# Patient Record
Sex: MALE | Race: WHITE | Employment: FULL TIME | URBAN - METROPOLITAN AREA
[De-identification: names, ages, dates, MRNs, and addresses within clinical notes are randomized per-mention and may not be internally consistent; named-entity substitution may affect disease eponyms.]

---

## 2017-02-20 ENCOUNTER — HOSPITAL ENCOUNTER (OUTPATIENT)
Age: 37
Discharge: HOME | End: 2017-02-20
Attending: FAMILY MEDICINE
Payer: COMMERCIAL

## 2017-02-20 VITALS
TEMPERATURE: 97 F | SYSTOLIC BLOOD PRESSURE: 128 MMHG | BODY MASS INDEX: 32 KG/M2 | DIASTOLIC BLOOD PRESSURE: 90 MMHG | WEIGHT: 219.81 LBS | HEART RATE: 79 BPM | RESPIRATION RATE: 16 BRPM | OXYGEN SATURATION: 97 %

## 2017-02-20 DIAGNOSIS — J20.9 ACUTE BRONCHITIS, UNSPECIFIED ORGANISM: Primary | ICD-10-CM

## 2017-02-20 PROCEDURE — 99213 OFFICE O/P EST LOW 20 MIN: CPT

## 2017-02-20 PROCEDURE — 99204 OFFICE O/P NEW MOD 45 MIN: CPT

## 2017-02-20 RX ORDER — AZITHROMYCIN 250 MG/1
TABLET, FILM COATED ORAL
Qty: 1 PACKAGE | Refills: 0 | Status: SHIPPED | OUTPATIENT
Start: 2017-02-20 | End: 2017-02-25

## 2017-02-20 RX ORDER — BENZONATATE 100 MG/1
100 CAPSULE ORAL 3 TIMES DAILY PRN
Qty: 21 CAPSULE | Refills: 0 | Status: SHIPPED | OUTPATIENT
Start: 2017-02-20 | End: 2017-02-27

## 2017-02-20 RX ORDER — ALBUTEROL SULFATE 90 UG/1
2 AEROSOL, METERED RESPIRATORY (INHALATION) EVERY 4 HOURS PRN
Qty: 1 INHALER | Refills: 0 | Status: SHIPPED | OUTPATIENT
Start: 2017-02-20 | End: 2017-03-22

## 2017-02-20 NOTE — ED PROVIDER NOTES
Patient presents with:  Cough/URI      HPI:     Shannan Ji is a 39year old male who presents for evaluation and management of a chief complaint of cough without respiratory distress. Location: Respiratory System. Onset gradual starting 1 week ago. visit (from the past 10 hour(s)). Diagnosis:    ICD-10-CM    1. Acute bronchitis, unspecified organism J20.9    started on z pack   Albuterol inhaler for coughing fits     All results reviewed and discussed with patient.   See AVS for detailed discharge

## 2017-02-23 ENCOUNTER — APPOINTMENT (OUTPATIENT)
Dept: GENERAL RADIOLOGY | Facility: HOSPITAL | Age: 37
End: 2017-02-23
Attending: EMERGENCY MEDICINE
Payer: COMMERCIAL

## 2017-02-23 ENCOUNTER — HOSPITAL ENCOUNTER (EMERGENCY)
Facility: HOSPITAL | Age: 37
Discharge: HOME | End: 2017-02-23
Attending: EMERGENCY MEDICINE
Payer: COMMERCIAL

## 2017-02-23 VITALS
BODY MASS INDEX: 31.5 KG/M2 | HEIGHT: 70 IN | HEART RATE: 60 BPM | OXYGEN SATURATION: 97 % | WEIGHT: 220 LBS | RESPIRATION RATE: 18 BRPM | SYSTOLIC BLOOD PRESSURE: 136 MMHG | DIASTOLIC BLOOD PRESSURE: 78 MMHG | TEMPERATURE: 99 F

## 2017-02-23 DIAGNOSIS — J40 BRONCHITIS: ICD-10-CM

## 2017-02-23 DIAGNOSIS — R55 SYNCOPE, VASOVAGAL: Primary | ICD-10-CM

## 2017-02-23 LAB
ATRIAL RATE: 61 BPM
P AXIS: 14 DEGREES
P-R INTERVAL: 170 MS
Q-T INTERVAL: 422 MS
QRS DURATION: 88 MS
QTC CALCULATION (BEZET): 424 MS
R AXIS: 14 DEGREES
T AXIS: 55 DEGREES
VENTRICULAR RATE: 61 BPM

## 2017-02-23 PROCEDURE — 99283 EMERGENCY DEPT VISIT LOW MDM: CPT

## 2017-02-23 PROCEDURE — 93005 ELECTROCARDIOGRAM TRACING: CPT

## 2017-02-23 PROCEDURE — 99284 EMERGENCY DEPT VISIT MOD MDM: CPT

## 2017-02-23 PROCEDURE — 71020 XR CHEST PA + LAT CHEST (CPT=71020): CPT

## 2017-02-23 PROCEDURE — 93010 ELECTROCARDIOGRAM REPORT: CPT

## 2017-02-23 RX ORDER — METHYLPREDNISOLONE 4 MG/1
TABLET ORAL
Qty: 1 PACKAGE | Refills: 0 | Status: SHIPPED | OUTPATIENT
Start: 2017-02-23 | End: 2017-02-28

## 2017-02-23 NOTE — ED INITIAL ASSESSMENT (HPI)
t here for cough x2 weeks. Diagnosed with bronchitis 3 days ago. Pt states he had a coughing fit at home this morning and passed out. Denies fever.

## 2017-02-23 NOTE — ED PROVIDER NOTES
Patient Seen in: [facility] Emergency Department    History   Patient presents with:  Hemoptysis (respiratory)    Stated Complaint: coughing    HPI    14-year-old male with a history of anxiety presents to the emergency department for syncopal episo Anxiety.        Family History   Problem Relation Age of Onset   • Skin cancer Paternal Grandfather          Smoking Status: Never Smoker                      Smokeless Status: Never Used                        Comment: per N unit per day for 7 y years deficit noted. Skin exam: Warm to touch. Good skin turgor. No lacerations, abrasions, lesions noted. ED Course   Labs Reviewed - No data to display   EKG    Rate, intervals and axes as noted on EKG Report.   Rate: 61  Rhythm: Sinus Rhythm  Reading: No Prescribed:  Current Discharge Medication List    START taking these medications    methylPREDNISolone 4 MG Oral Tablet Therapy Pack  Dosepack: use as directed on packaging  Qty: 1 Package Refills: 0

## 2017-03-21 PROBLEM — R05.2 SUBACUTE COUGH: Status: ACTIVE | Noted: 2017-03-21

## 2017-03-28 ENCOUNTER — OFFICE VISIT (OUTPATIENT)
Dept: FAMILY MEDICINE CLINIC | Facility: CLINIC | Age: 37
End: 2017-03-28

## 2017-03-28 VITALS
DIASTOLIC BLOOD PRESSURE: 82 MMHG | HEART RATE: 74 BPM | RESPIRATION RATE: 16 BRPM | BODY MASS INDEX: 32.93 KG/M2 | HEIGHT: 70 IN | SYSTOLIC BLOOD PRESSURE: 125 MMHG | TEMPERATURE: 99 F | WEIGHT: 230 LBS

## 2017-03-28 DIAGNOSIS — R05.9 COUGH: ICD-10-CM

## 2017-03-28 DIAGNOSIS — Z00.00 ROUTINE PHYSICAL EXAMINATION: ICD-10-CM

## 2017-03-28 DIAGNOSIS — R55 VASOVAGAL SYNCOPE: ICD-10-CM

## 2017-03-28 PROCEDURE — 99395 PREV VISIT EST AGE 18-39: CPT | Performed by: FAMILY MEDICINE

## 2017-03-28 NOTE — PROGRESS NOTES
HPI:    Patient ID: Marcos Don is a 39year old male. HPI Comments: Patient is here for routine physical exam. No acute issues. No significant chronic medical problems. Patient is requesting blood testing. Diet and exercise have been good.  Past med rhythm, normal heart sounds and intact distal pulses. Pulmonary/Chest: Breath sounds normal. No respiratory distress. He has no wheezes. He has no rales. Abdominal: Soft. He exhibits no distension. There is no tenderness. There is no rebound.    Muscul

## 2017-04-01 ENCOUNTER — LAB ENCOUNTER (OUTPATIENT)
Dept: LAB | Age: 37
End: 2017-04-01
Attending: FAMILY MEDICINE
Payer: COMMERCIAL

## 2017-04-01 DIAGNOSIS — Z00.00 ROUTINE PHYSICAL EXAMINATION: ICD-10-CM

## 2017-04-01 PROCEDURE — 36415 COLL VENOUS BLD VENIPUNCTURE: CPT

## 2017-04-01 PROCEDURE — 80053 COMPREHEN METABOLIC PANEL: CPT

## 2017-04-01 PROCEDURE — 84443 ASSAY THYROID STIM HORMONE: CPT

## 2017-04-01 PROCEDURE — 80061 LIPID PANEL: CPT

## 2017-04-01 PROCEDURE — 85025 COMPLETE CBC W/AUTO DIFF WBC: CPT

## 2017-04-14 RX ORDER — LEVOCETIRIZINE DIHYDROCHLORIDE 5 MG/1
TABLET, FILM COATED ORAL
Qty: 90 TABLET | Refills: 1 | OUTPATIENT
Start: 2017-04-14

## 2017-04-14 NOTE — TELEPHONE ENCOUNTER
Call reviewed and noted. Patient due for one-year follow-up. Refill request for 90 days to [facility] denied at this time. Please call to schedule a follow-up appointment. Once appointment is made we may refill Xyzal locally for 30 days.   No local

## 2017-04-14 NOTE — TELEPHONE ENCOUNTER
Patient is requesting a refill of Xyzal 5 MG- 1 tablet by mouth daily #90, sent to [facility]. Patient was last seen for an office visit 4-18-16 and per Dr. Kiko Sam progress notes to follow up in 1 year. No office visit scheduled as of today 4-14-17.

## 2017-04-15 NOTE — TELEPHONE ENCOUNTER
Left message for patient to call back regarding refill request. Our office open until 11:30 AM today.

## 2017-04-20 ENCOUNTER — OFFICE VISIT (OUTPATIENT)
Dept: ALLERGY | Facility: CLINIC | Age: 37
End: 2017-04-20

## 2017-04-20 VITALS
RESPIRATION RATE: 18 BRPM | HEART RATE: 87 BPM | WEIGHT: 232 LBS | OXYGEN SATURATION: 97 % | BODY MASS INDEX: 33 KG/M2 | SYSTOLIC BLOOD PRESSURE: 126 MMHG | TEMPERATURE: 98 F | DIASTOLIC BLOOD PRESSURE: 63 MMHG

## 2017-04-20 DIAGNOSIS — H10.10 ALLERGIC CONJUNCTIVITIS, UNSPECIFIED LATERALITY: ICD-10-CM

## 2017-04-20 DIAGNOSIS — R05.9 COUGH: ICD-10-CM

## 2017-04-20 DIAGNOSIS — J30.1 SEASONAL ALLERGIC RHINITIS DUE TO POLLEN: Primary | ICD-10-CM

## 2017-04-20 PROCEDURE — 99214 OFFICE O/P EST MOD 30 MIN: CPT | Performed by: ALLERGY & IMMUNOLOGY

## 2017-04-20 PROCEDURE — 99212 OFFICE O/P EST SF 10 MIN: CPT | Performed by: ALLERGY & IMMUNOLOGY

## 2017-04-20 RX ORDER — LEVOCETIRIZINE DIHYDROCHLORIDE 5 MG/1
TABLET, FILM COATED ORAL
Qty: 90 TABLET | Refills: 2 | Status: SHIPPED | OUTPATIENT
Start: 2017-04-20 | End: 2017-10-17 | Stop reason: ALTCHOICE

## 2017-04-20 NOTE — PROGRESS NOTES
Joel Gar is a 39year old male. HPI:   Patient presents with: Allergies: refill request     Patient is a 26-year-old male who presents for follow-up with a chief complaint of allergies. Patient last seen by me on April 18, 2016.   Patient wit Take 1 tablet (25 mg total) by mouth once daily. Disp: 90 tablet Rfl: 0   alprazolam (XANAX) 0.25 MG Oral Tab Take 1 tablet (0.25 mg total) by mouth nightly as needed for Anxiety.  Disp: 30 tablet Rfl: 0       Allergies:  No Known Allergies      ROS:   Daniel Flonase on a daily basis for best effect compared to as needed  Continue with Xyzal    2. Cough   Patient with a 2 month history of a dry nonproductive cough.   By history cough has been improving over time with recent trial of our annuity through his pulm

## 2017-05-01 ENCOUNTER — OFFICE VISIT (OUTPATIENT)
Dept: FAMILY MEDICINE CLINIC | Facility: CLINIC | Age: 37
End: 2017-05-01

## 2017-05-01 VITALS
DIASTOLIC BLOOD PRESSURE: 87 MMHG | RESPIRATION RATE: 16 BRPM | TEMPERATURE: 98 F | WEIGHT: 234 LBS | HEART RATE: 72 BPM | SYSTOLIC BLOOD PRESSURE: 128 MMHG | BODY MASS INDEX: 34 KG/M2

## 2017-05-01 DIAGNOSIS — H66.91 ACUTE EAR INFECTION, RIGHT: ICD-10-CM

## 2017-05-01 PROCEDURE — 99212 OFFICE O/P EST SF 10 MIN: CPT | Performed by: FAMILY MEDICINE

## 2017-05-01 PROCEDURE — 99213 OFFICE O/P EST LOW 20 MIN: CPT | Performed by: FAMILY MEDICINE

## 2017-05-01 RX ORDER — AMOXICILLIN 875 MG/1
875 TABLET, COATED ORAL 2 TIMES DAILY
Qty: 20 TABLET | Refills: 0 | Status: SHIPPED | OUTPATIENT
Start: 2017-05-01 | End: 2017-10-17 | Stop reason: ALTCHOICE

## 2017-05-01 NOTE — PROGRESS NOTES
Called patient's [facility]. Zofran will need a PA. PA done through patient's prescription plan which is [employer]. Awaiting approval. In the meantime, daughter to call Hospice RN to let them know she is out of medication for patient. HPI:    Patient ID: Colt Cousin is a 39year old male. HPI Comments: Pt presents with right ear ringing pressure or discomfort today. No fevers or drainage for ear. Pt has had some intermittent URI symptoms.     Ear Problem   There is pain in the rig Imaging & Referrals:  None       RI#[id number]

## 2017-07-31 NOTE — TELEPHONE ENCOUNTER
Please advise on refill request   Was not addressed at last couple office visits.     Refill Protocol Appointment Criteria  · Appointment scheduled in the past 6 months or in the next 3 months  Recent Outpatient Visits            3 months ago Acute ear infe

## 2017-07-31 NOTE — TELEPHONE ENCOUNTER
Pt calling regarding refill request for       Current Outpatient Prescriptions:  PARoxetine HCl ER 25 MG Oral Tablet 24 Hr Take 1 tablet (25 mg total) by mouth once daily.  Disp: 90 tablet Rfl: 0

## 2017-08-01 RX ORDER — PAROXETINE HYDROCHLORIDE 25 MG/1
25 TABLET, FILM COATED, EXTENDED RELEASE ORAL
Qty: 90 TABLET | Refills: 1 | Status: SHIPPED | OUTPATIENT
Start: 2017-08-01 | End: 2017-10-17 | Stop reason: ALTCHOICE

## 2017-08-02 NOTE — TELEPHONE ENCOUNTER
Message noted: Chart reviewed and may refill medication as requested times one with 1 additional refills. Prescription sent to listed pharmacy. Please notify patient.

## 2017-10-25 PROBLEM — M25.562 CHRONIC PAIN OF LEFT KNEE: Status: ACTIVE | Noted: 2017-10-25

## 2017-10-25 PROBLEM — G89.29 CHRONIC PAIN OF LEFT KNEE: Status: ACTIVE | Noted: 2017-10-25

## 2017-10-31 RX ORDER — PAROXETINE HYDROCHLORIDE 25 MG/1
TABLET, FILM COATED, EXTENDED RELEASE ORAL
Qty: 90 TABLET | Refills: 1 | Status: SHIPPED | OUTPATIENT
Start: 2017-10-31 | End: 2017-12-01

## 2017-10-31 NOTE — TELEPHONE ENCOUNTER
Message noted: Chart reviewed and may refill medication times one 90 day supply as requested with 1 additional refill. Prescription sent to listed pharmacy. Pharmacy to notify patient.

## 2018-01-09 ENCOUNTER — OFFICE VISIT (OUTPATIENT)
Dept: FAMILY MEDICINE CLINIC | Facility: CLINIC | Age: 38
End: 2018-01-09

## 2018-01-09 VITALS
SYSTOLIC BLOOD PRESSURE: 117 MMHG | HEIGHT: 69 IN | HEART RATE: 62 BPM | TEMPERATURE: 98 F | BODY MASS INDEX: 32.88 KG/M2 | DIASTOLIC BLOOD PRESSURE: 81 MMHG | RESPIRATION RATE: 18 BRPM | WEIGHT: 222 LBS

## 2018-01-09 DIAGNOSIS — F41.9 ANXIETY: ICD-10-CM

## 2018-01-09 PROCEDURE — 99213 OFFICE O/P EST LOW 20 MIN: CPT | Performed by: FAMILY MEDICINE

## 2018-01-09 PROCEDURE — 99212 OFFICE O/P EST SF 10 MIN: CPT | Performed by: FAMILY MEDICINE

## 2018-01-09 RX ORDER — ALPRAZOLAM 0.25 MG/1
TABLET ORAL
Qty: 30 TABLET | OUTPATIENT
Start: 2018-01-09

## 2018-01-09 RX ORDER — PAROXETINE HYDROCHLORIDE 12.5 MG/1
12.5 TABLET, FILM COATED, EXTENDED RELEASE ORAL EVERY MORNING
Qty: 90 TABLET | Refills: 0 | Status: SHIPPED | OUTPATIENT
Start: 2018-01-09 | End: 2018-04-03

## 2018-01-09 NOTE — PROGRESS NOTES
HPI:    Patient ID: Georgette Guthrie is a 44year old male. Patient is here for follow up for chronic medical issues- anxiety. The patient is compliant with medications and no side effects. There are no acute issues and patient is requesting refills.  The by mouth every morning.            Imaging & Referrals:  None       #[id number]

## 2018-01-16 RX ORDER — ALPRAZOLAM 0.5 MG/1
TABLET ORAL
Qty: 10 TABLET | Refills: 0 | Status: SHIPPED
Start: 2018-01-16 | End: 2019-08-02 | Stop reason: ALTCHOICE

## 2018-01-16 NOTE — TELEPHONE ENCOUNTER
LOV 01/09. Last refill was 12/11/17 but was refilled by Dr. Oniel Willis for the MRI. Please advise.

## 2018-02-22 PROBLEM — H93.13 BILATERAL TINNITUS: Status: ACTIVE | Noted: 2018-02-22

## 2018-02-22 PROBLEM — H93.293 ABNORMAL AUDITORY PERCEPTION, BILATERAL: Status: ACTIVE | Noted: 2018-02-22

## 2018-04-03 RX ORDER — PAROXETINE HYDROCHLORIDE HEMIHYDRATE 12.5 MG/1
12.5 TABLET, FILM COATED, EXTENDED RELEASE ORAL EVERY MORNING
Qty: 90 TABLET | Refills: 1 | Status: SHIPPED | OUTPATIENT
Start: 2018-04-03 | End: 2018-10-01

## 2018-04-03 NOTE — TELEPHONE ENCOUNTER
Message noted: Chart reviewed and may refill medication times one 90 day supply as requested with 1 additional refill. Prescription sent to listed pharmacy. If medication is not effective or having side effects to follow up for appt.

## 2018-05-21 NOTE — TELEPHONE ENCOUNTER
Notified patient that he needs to notify his new pharmacy that he would like to transfer his RX over, and they can initiate that process with his old pharmacy. Pt verbalizes his understanding. No action is needed on our end.

## 2018-05-23 RX ORDER — LEVOCETIRIZINE DIHYDROCHLORIDE 5 MG/1
5 TABLET, FILM COATED ORAL EVERY EVENING
Qty: 30 TABLET | Refills: 0 | Status: SHIPPED | OUTPATIENT
Start: 2018-05-23 | End: 2018-06-07

## 2018-05-23 NOTE — TELEPHONE ENCOUNTER
Notified patient. He verbalized his understanding. Will see us at appointment. No further action needed at this time.

## 2018-05-23 NOTE — TELEPHONE ENCOUNTER
[facility] and his RX is over a year old so it is . Pt requesting a refill to be sent to his local pharmacy. Pt is due to follow up with Dr. Isreal Box. He is scheduled for a f/u on 2018.    Pt understands it is up to DR. Ramy Cade

## 2018-05-23 NOTE — TELEPHONE ENCOUNTER
Call reviewed and noted. Patient last seen in April 2017 and has a follow-up appointment scheduled for June 2018. Xyzal refilled ×1 month.   No further refills until seen in office for follow-up

## 2018-06-07 ENCOUNTER — OFFICE VISIT (OUTPATIENT)
Dept: ALLERGY | Facility: CLINIC | Age: 38
End: 2018-06-07

## 2018-06-07 VITALS
DIASTOLIC BLOOD PRESSURE: 68 MMHG | WEIGHT: 218 LBS | SYSTOLIC BLOOD PRESSURE: 102 MMHG | HEIGHT: 70 IN | BODY MASS INDEX: 31.21 KG/M2 | TEMPERATURE: 99 F

## 2018-06-07 DIAGNOSIS — H10.10 ALLERGIC CONJUNCTIVITIS, UNSPECIFIED LATERALITY: ICD-10-CM

## 2018-06-07 DIAGNOSIS — J30.1 SEASONAL ALLERGIC RHINITIS DUE TO POLLEN: Primary | ICD-10-CM

## 2018-06-07 PROCEDURE — 99214 OFFICE O/P EST MOD 30 MIN: CPT | Performed by: ALLERGY & IMMUNOLOGY

## 2018-06-07 PROCEDURE — 99212 OFFICE O/P EST SF 10 MIN: CPT | Performed by: ALLERGY & IMMUNOLOGY

## 2018-06-07 RX ORDER — FLUTICASONE PROPIONATE 50 MCG
1 SPRAY, SUSPENSION (ML) NASAL DAILY
Qty: 1 BOTTLE | Refills: 1 | Status: SHIPPED | OUTPATIENT
Start: 2018-06-07 | End: 2018-11-12

## 2018-06-07 RX ORDER — LEVOCETIRIZINE DIHYDROCHLORIDE 5 MG/1
5 TABLET, FILM COATED ORAL EVERY EVENING
Qty: 90 TABLET | Refills: 1 | Status: SHIPPED | OUTPATIENT
Start: 2018-06-07 | End: 2019-02-02

## 2018-06-07 NOTE — PROGRESS NOTES
Quentin Kebede is a 44year old male. HPI:   No chief complaint on file. Patient is a 42-year-old male who presents for follow-up with a chief complaint of allergies.     Patient last seen by me on April 20, 2017 with a history of seasonal allergic r Allergies:  No Known Allergies      ROS:   Allergic/Immuno:  See hpi  Cardiovascular:  Negative for irregular heartbeat/palpitations, chest pain, edema  Constitutional:  Negative night sweats,weight loss, irritability and lethargy  ENMT:  Negative fo Visit:  No orders of the defined types were placed in this encounter.       Meds This Visit:    No prescriptions requested or ordered in this encounter    Imaging & Referrals:  None     6/7/2018  Dali Cabezas MD    If medication samples were provided t

## 2018-10-02 RX ORDER — PAROXETINE HYDROCHLORIDE 12.5 MG/1
12.5 TABLET, FILM COATED, EXTENDED RELEASE ORAL EVERY MORNING
Qty: 90 TABLET | Refills: 0 | Status: SHIPPED | OUTPATIENT
Start: 2018-10-02 | End: 2018-11-12

## 2018-11-12 ENCOUNTER — OFFICE VISIT (OUTPATIENT)
Dept: FAMILY MEDICINE CLINIC | Facility: CLINIC | Age: 38
End: 2018-11-12
Payer: COMMERCIAL

## 2018-11-12 VITALS
DIASTOLIC BLOOD PRESSURE: 75 MMHG | SYSTOLIC BLOOD PRESSURE: 115 MMHG | WEIGHT: 228 LBS | RESPIRATION RATE: 20 BRPM | HEIGHT: 70 IN | TEMPERATURE: 97 F | HEART RATE: 91 BPM | BODY MASS INDEX: 32.64 KG/M2

## 2018-11-12 DIAGNOSIS — F41.9 ANXIETY: ICD-10-CM

## 2018-11-12 DIAGNOSIS — Z00.00 ROUTINE PHYSICAL EXAMINATION: ICD-10-CM

## 2018-11-12 PROCEDURE — 99395 PREV VISIT EST AGE 18-39: CPT | Performed by: FAMILY MEDICINE

## 2018-11-12 RX ORDER — PAROXETINE HYDROCHLORIDE 25 MG/1
25 TABLET, FILM COATED, EXTENDED RELEASE ORAL EVERY MORNING
Qty: 90 TABLET | Refills: 3 | Status: SHIPPED | OUTPATIENT
Start: 2018-11-12 | End: 2019-11-02

## 2018-11-12 NOTE — PROGRESS NOTES
HPI:    Patient ID: Yoselin Ventura is a 43year old male. Patient is here for follow up for chronic medical issues- anxiety. The patient is compliant with medications and no side effects. There are no acute issues and patient is to discuss refills.  The breath sounds normal.   Abdominal: Soft. He exhibits no distension. There is no tenderness. There is no rebound. Musculoskeletal: Normal range of motion. He exhibits no edema. Lymphadenopathy:     He has no cervical adenopathy.    Neurological: He has n

## 2018-11-16 ENCOUNTER — APPOINTMENT (OUTPATIENT)
Dept: LAB | Age: 38
End: 2018-11-16
Attending: FAMILY MEDICINE
Payer: COMMERCIAL

## 2018-11-16 DIAGNOSIS — Z00.00 ROUTINE PHYSICAL EXAMINATION: ICD-10-CM

## 2018-11-16 PROCEDURE — 80061 LIPID PANEL: CPT

## 2018-11-16 PROCEDURE — 84443 ASSAY THYROID STIM HORMONE: CPT

## 2018-11-16 PROCEDURE — 80053 COMPREHEN METABOLIC PANEL: CPT

## 2018-11-16 PROCEDURE — 85027 COMPLETE CBC AUTOMATED: CPT

## 2018-11-16 PROCEDURE — 36415 COLL VENOUS BLD VENIPUNCTURE: CPT

## 2019-02-02 RX ORDER — LEVOCETIRIZINE DIHYDROCHLORIDE 5 MG/1
TABLET, FILM COATED ORAL
Qty: 90 TABLET | Refills: 0 | Status: SHIPPED | OUTPATIENT
Start: 2019-02-02 | End: 2019-05-19

## 2019-05-20 RX ORDER — FLUTICASONE PROPIONATE 50 MCG
SPRAY, SUSPENSION (ML) NASAL
Qty: 1 BOTTLE | Refills: 0 | Status: SHIPPED | OUTPATIENT
Start: 2019-05-20 | End: 2019-06-21

## 2019-05-20 RX ORDER — LEVOCETIRIZINE DIHYDROCHLORIDE 5 MG/1
TABLET, FILM COATED ORAL
Qty: 30 TABLET | Refills: 0 | Status: SHIPPED | OUTPATIENT
Start: 2019-05-20 | End: 2019-06-06

## 2019-05-22 RX ORDER — LEVOCETIRIZINE DIHYDROCHLORIDE 5 MG/1
TABLET, FILM COATED ORAL
Qty: 90 TABLET | Refills: 0 | Status: SHIPPED | OUTPATIENT
Start: 2019-05-22 | End: 2019-06-06

## 2019-05-22 NOTE — TELEPHONE ENCOUNTER
Refill requested for:   Name from pharmacy: LEVOCETIRIZINE 5 MG TABLET         Will file in chart as: LEVOCETIRIZINE DIHYDROCHLORIDE 5 MG Oral Tab    The source prescription was reordered on 5/20/2019 by Ashia Gardiner RN.     Sig: TAKE 1 TABLET BY MOUTH ADELFO

## 2019-06-06 ENCOUNTER — OFFICE VISIT (OUTPATIENT)
Dept: ALLERGY | Facility: CLINIC | Age: 39
End: 2019-06-06
Payer: COMMERCIAL

## 2019-06-06 VITALS
SYSTOLIC BLOOD PRESSURE: 144 MMHG | TEMPERATURE: 99 F | RESPIRATION RATE: 16 BRPM | DIASTOLIC BLOOD PRESSURE: 88 MMHG | OXYGEN SATURATION: 95 % | HEART RATE: 85 BPM

## 2019-06-06 DIAGNOSIS — H10.10 ALLERGIC CONJUNCTIVITIS, UNSPECIFIED LATERALITY: ICD-10-CM

## 2019-06-06 DIAGNOSIS — J30.1 SEASONAL ALLERGIC RHINITIS DUE TO POLLEN: Primary | ICD-10-CM

## 2019-06-06 PROCEDURE — 99214 OFFICE O/P EST MOD 30 MIN: CPT | Performed by: ALLERGY & IMMUNOLOGY

## 2019-06-06 PROCEDURE — 99212 OFFICE O/P EST SF 10 MIN: CPT | Performed by: ALLERGY & IMMUNOLOGY

## 2019-06-06 RX ORDER — LEVOCETIRIZINE DIHYDROCHLORIDE 5 MG/1
5 TABLET, FILM COATED ORAL NIGHTLY
Qty: 90 TABLET | Refills: 2 | Status: SHIPPED | OUTPATIENT
Start: 2019-06-06 | End: 2020-06-03

## 2019-06-06 NOTE — PATIENT INSTRUCTIONS
Recs:  Reviewed avoidance and potential of immunotherapy   Continue with xyzal 5 mg once a night at bedtime  Add flonase if refactory   Consider singulair

## 2019-06-06 NOTE — PROGRESS NOTES
Adriana Vargas is a 43year old male. HPI:   No chief complaint on file. Patient is a 58-year-old male who presents for follow-up with a chief complaint of allergies.     Patient last seen by me in June 2018    Pt has a history of seasonal allergic r mri. May repeat x 1 if needed. Disp: 10 tablet Rfl: 0   Multiple Vitamin (MULTI-VITAMIN DAILY) Oral Tab Take by mouth.  Disp:  Rfl:        Allergies:  No Known Allergies      ROS:   Allergic/Immuno:  See hpi  Cardiovascular:  Negative for irregular heartbea encounter       Imaging & Referrals:  None     6/6/2019  Ion Christensen MD    If medication samples were provided today, they were provided solely for patient education and training related to self administration of these medications.   Teaching, instruc

## 2019-06-21 RX ORDER — FLUTICASONE PROPIONATE 50 MCG
2 SPRAY, SUSPENSION (ML) NASAL DAILY
Qty: 3 BOTTLE | Refills: 2 | Status: SHIPPED | OUTPATIENT
Start: 2019-06-21

## 2019-06-21 NOTE — TELEPHONE ENCOUNTER
Refill requested for:  Name from pharmacy: FLUTICASONE PROP 50 MCG SPRAY          Will file in chart as: FLUTICASONE PROPIONATE 50 MCG/ACT Nasal Suspension    Sig: SPRAY 1 SPRAY INTO EACH NOSTRIL EVERY DAY    Disp:  Not specified (Pharmacy requested: 16 mL

## 2019-11-03 RX ORDER — PAROXETINE HYDROCHLORIDE 25 MG/1
TABLET, FILM COATED, EXTENDED RELEASE ORAL
Qty: 90 TABLET | Refills: 1 | Status: SHIPPED | OUTPATIENT
Start: 2019-11-03 | End: 2020-05-04

## 2019-11-03 NOTE — TELEPHONE ENCOUNTER
Refill passed per [facility] protocol.   Refill Protocol Appointment Criteria  · Appointment scheduled in the past 6 months or in the next 3 months  Recent Outpatient Visits            3 months ago Sternal pain     [place]

## 2020-05-04 ENCOUNTER — TELEPHONE (OUTPATIENT)
Dept: FAMILY MEDICINE CLINIC | Facility: CLINIC | Age: 40
End: 2020-05-04

## 2020-05-04 RX ORDER — PAROXETINE HYDROCHLORIDE 25 MG/1
25 TABLET, FILM COATED, EXTENDED RELEASE ORAL EVERY MORNING
Qty: 90 TABLET | Refills: 0 | Status: SHIPPED | OUTPATIENT
Start: 2020-05-04 | End: 2020-07-30

## 2020-05-04 NOTE — TELEPHONE ENCOUNTER
Message noted: Chart reviewed and may refill medication as requested times one. Prescription sent to listed pharmacy. Pharmacy to notify patient to make appointment for further refills.

## 2020-06-03 RX ORDER — LEVOCETIRIZINE DIHYDROCHLORIDE 5 MG/1
TABLET, FILM COATED ORAL
Qty: 30 TABLET | Refills: 0 | Status: SHIPPED | OUTPATIENT
Start: 2020-06-03 | End: 2020-06-16

## 2020-06-03 NOTE — TELEPHONE ENCOUNTER
Refill requested for    Name from pharmacy: LEVOCETIRIZINE 5 MG TABLET         Will file in chart as: LEVOCETIRIZINE DIHYDROCHLORIDE 5 MG Oral Tab         Sig: TAKE 1 TABLET BY MOUTH EVERY DAY IN THE EVENING    Disp:  30 tablet    Refills:  2    Start: 6/3

## 2020-06-16 ENCOUNTER — OFFICE VISIT (OUTPATIENT)
Dept: ALLERGY | Facility: CLINIC | Age: 40
End: 2020-06-16
Payer: COMMERCIAL

## 2020-06-16 VITALS
RESPIRATION RATE: 18 BRPM | TEMPERATURE: 97 F | OXYGEN SATURATION: 97 % | DIASTOLIC BLOOD PRESSURE: 82 MMHG | HEART RATE: 74 BPM | SYSTOLIC BLOOD PRESSURE: 129 MMHG

## 2020-06-16 DIAGNOSIS — J30.1 SEASONAL ALLERGIC RHINITIS DUE TO POLLEN: Primary | ICD-10-CM

## 2020-06-16 DIAGNOSIS — H10.10 ALLERGIC CONJUNCTIVITIS, UNSPECIFIED LATERALITY: ICD-10-CM

## 2020-06-16 PROCEDURE — 99213 OFFICE O/P EST LOW 20 MIN: CPT | Performed by: ALLERGY & IMMUNOLOGY

## 2020-06-16 RX ORDER — LEVOCETIRIZINE DIHYDROCHLORIDE 5 MG/1
5 TABLET, FILM COATED ORAL NIGHTLY
Qty: 90 TABLET | Refills: 1 | Status: SHIPPED | OUTPATIENT
Start: 2020-06-16 | End: 2021-04-03

## 2020-06-16 NOTE — PATIENT INSTRUCTIONS
Recs:  Continue with Xyzal, levocetirizine 5 mg once a night at bedtime  Add Flonase 2 sprays per nostril once a day if refractory  Add a trial of Zaditor eyedrops 1 drop per eye twice a day or Pataday eyedrops 1 drop per eye once a day.   Please store  eye

## 2020-06-16 NOTE — PROGRESS NOTES
Mikael Hong is a 35year old male. HPI:   Patient presents with: Allergies: Routine follow up. Reports allergies are doing okay but this time of year allergies are typically worse.      Patient is a 35year-old male who presents for follow-up with a DAILY) Oral Tab Take by mouth.          Allergies:  No Known Allergies      ROS:   Allergic/Immuno:  See hpi  Cardiovascular:  Negative for irregular heartbeat/palpitations, chest pain, edema  Constitutional:  Negative night sweats,weight loss, irritability Visit:  No orders of the defined types were placed in this encounter.       Meds This Visit:  Requested Prescriptions     Signed Prescriptions Disp Refills   • Levocetirizine Dihydrochloride (XYZAL) 5 MG Oral Tab 90 tablet 1     Sig: Take 1 tablet (5 mg tot

## 2020-07-30 RX ORDER — PAROXETINE HYDROCHLORIDE 25 MG/1
TABLET, FILM COATED, EXTENDED RELEASE ORAL
Qty: 30 TABLET | Refills: 2 | Status: SHIPPED | OUTPATIENT
Start: 2020-07-30 | End: 2020-10-06

## 2020-08-07 PROBLEM — H93.231 HYPERACUSIS OF RIGHT EAR: Status: ACTIVE | Noted: 2020-08-07

## 2020-08-07 PROBLEM — H93.299 ABNORMAL AUDITORY PERCEPTION, UNSPECIFIED LATERALITY: Status: ACTIVE | Noted: 2018-02-22

## 2020-10-07 ENCOUNTER — TELEMEDICINE (OUTPATIENT)
Dept: FAMILY MEDICINE CLINIC | Facility: CLINIC | Age: 40
End: 2020-10-07

## 2020-10-07 DIAGNOSIS — F41.9 ANXIETY: ICD-10-CM

## 2020-10-07 PROCEDURE — 99213 OFFICE O/P EST LOW 20 MIN: CPT | Performed by: FAMILY MEDICINE

## 2020-10-07 RX ORDER — PAROXETINE HYDROCHLORIDE 25 MG/1
25 TABLET, FILM COATED, EXTENDED RELEASE ORAL EVERY MORNING
Qty: 30 TABLET | Refills: 11 | Status: SHIPPED | OUTPATIENT
Start: 2020-10-07 | End: 2021-11-17

## 2020-10-07 RX ORDER — PAROXETINE HYDROCHLORIDE 25 MG/1
25 TABLET, FILM COATED, EXTENDED RELEASE ORAL EVERY MORNING
Qty: 30 TABLET | Refills: 2 | Status: SHIPPED | OUTPATIENT
Start: 2020-10-07 | End: 2020-10-07

## 2020-10-07 NOTE — PROGRESS NOTES
HPI:    Patient ID: Simba Murillo is a 38year old male. Virtual Telephone Check-In    Simba Murillo verbally consents to a Virtual/Telephone Check-In visit on 10/07/20.   Patient has been referred to the [facility] website at [email or web address] to r Routine follow up in 6-12 months. Also Hx of COVID 19: recovering well: call if any sig symptoms. No orders of the defined types were placed in this encounter.       Meds This Visit:  Requested Prescriptions     Signed Prescriptions Disp Refills

## 2021-04-05 RX ORDER — LEVOCETIRIZINE DIHYDROCHLORIDE 5 MG/1
5 TABLET, FILM COATED ORAL NIGHTLY
Qty: 90 TABLET | Refills: 0 | Status: SHIPPED | OUTPATIENT
Start: 2021-04-05 | End: 2021-06-30

## 2021-04-05 NOTE — TELEPHONE ENCOUNTER
Patient last seen in Allergy 6/16/2020 for . . .    Seasonal allergic rhinitis due to pollen  (primary encounter diagnosis)  Allergic conjunctivitis, unspecified laterality    Refill request received for .  . .    Levocetirizine Dihydrochloride (XYZAL) 5 MG

## 2021-04-05 NOTE — TELEPHONE ENCOUNTER
Left a message for patient per Dr. Flavio Mallory requested refill (Xyzal) has been granted and to please contact our office to schedule a 15 minute appointment for June 2021, any questions to please contact our office.

## 2021-06-30 RX ORDER — LEVOCETIRIZINE DIHYDROCHLORIDE 5 MG/1
5 TABLET, FILM COATED ORAL NIGHTLY
Qty: 30 TABLET | Refills: 0 | Status: SHIPPED | OUTPATIENT
Start: 2021-06-30 | End: 2021-07-26

## 2021-06-30 NOTE — TELEPHONE ENCOUNTER
Left a message for patient requested medication (Xyzal ) has been refilled and to please contact our office to schedule an appointment per Dr. Michel Mcintosh.

## 2021-06-30 NOTE — TELEPHONE ENCOUNTER
Refill requested for   Name from pharmacy: LEVOCETIRIZINE 5 MG TABLET          Will file in chart as: LEVOCETIRIZINE DIHYDROCHLORIDE 5 MG Oral Tab    Sig: TAKE 1 TABLET BY MOUTH EVERY DAY AT NIGHT    Disp:  30 tablet    Refills:  2    Start: 6/30/2021    C

## 2021-07-26 RX ORDER — LEVOCETIRIZINE DIHYDROCHLORIDE 5 MG/1
5 TABLET, FILM COATED ORAL NIGHTLY
Qty: 30 TABLET | Refills: 0 | Status: SHIPPED | OUTPATIENT
Start: 2021-07-26 | End: 2021-12-02

## 2021-07-26 NOTE — TELEPHONE ENCOUNTER
Current Outpatient Medications   Medication Sig Dispense Refill   • Levocetirizine Dihydrochloride 5 MG Oral Tab Take 1 tablet (5 mg total) by mouth nightly.  30 tablet 0

## 2021-07-26 NOTE — TELEPHONE ENCOUNTER
Left message for patient to follow up for his yearly visit. A 30 day supply sent in the interim. All other refills will be addressed at follow up visit.

## 2021-07-26 NOTE — TELEPHONE ENCOUNTER
Refill requested for   Levocetirizine Dihydrochloride 5 MG Oral Tab Take 1 tablet (5 mg total) by mouth nightly.  30 tablet 0        Last office visit: 6/16/2020     Previously advised to follow up in Follow-up in 1 year or sooner if needed    F/U currently

## 2021-11-17 RX ORDER — LEVOCETIRIZINE DIHYDROCHLORIDE 5 MG/1
5 TABLET, FILM COATED ORAL NIGHTLY
Qty: 90 TABLET | Refills: 0 | OUTPATIENT
Start: 2021-11-17

## 2021-11-17 NOTE — TELEPHONE ENCOUNTER
Virtual appointment made [No Acute Distress] : no acute distress [Well Developed] : well developed [Well Nourished] : well nourished [Well-Appearing] : well-appearing [Normal Sclera/Conjunctiva] : normal sclera/conjunctiva [EOMI] : extraocular movements intact [Normal Outer Ear/Nose] : the outer ears and nose were normal in appearance [PERRL] : pupils equal round and reactive to light [Normal Oropharynx] : the oropharynx was normal [No JVD] : no jugular venous distention [No Lymphadenopathy] : no lymphadenopathy [Supple] : supple [Thyroid Normal, No Nodules] : the thyroid was normal and there were no nodules present [Clear to Auscultation] : lungs were clear to auscultation bilaterally [No Respiratory Distress] : no respiratory distress  [No Accessory Muscle Use] : no accessory muscle use [Normal Rate] : normal rate  [Regular Rhythm] : with a regular rhythm [Normal S1, S2] : normal S1 and S2 [No Murmur] : no murmur heard [No Carotid Bruits] : no carotid bruits [No Abdominal Bruit] : a ~M bruit was not heard ~T in the abdomen [No Varicosities] : no varicosities [Pedal Pulses Present] : the pedal pulses are present [No Edema] : there was no peripheral edema [No Palpable Aorta] : no palpable aorta [No Extremity Clubbing/Cyanosis] : no extremity clubbing/cyanosis [Soft] : abdomen soft [Non Tender] : non-tender [No Masses] : no abdominal mass palpated [Non-distended] : non-distended [Normal Bowel Sounds] : normal bowel sounds [Normal Posterior Cervical Nodes] : no posterior cervical lymphadenopathy [No HSM] : no HSM [Normal Anterior Cervical Nodes] : no anterior cervical lymphadenopathy [No CVA Tenderness] : no CVA  tenderness [No Spinal Tenderness] : no spinal tenderness [No Joint Swelling] : no joint swelling [Grossly Normal Strength/Tone] : grossly normal strength/tone [No Rash] : no rash [Coordination Grossly Intact] : coordination grossly intact [Normal Affect] : the affect was normal [No Focal Deficits] : no focal deficits [Normal Gait] : normal gait [Normal Insight/Judgement] : insight and judgment were intact

## 2021-11-17 NOTE — TELEPHONE ENCOUNTER
Received a refill request from [facility] for a 90 day supply of Xyzal 5 mg- 1 tablet by mouth daily. LOV 6/16/20 for allergies. Patient cancelled appointment 8/16/21. No appointment scheduled as of today.     Refill pended and forwarded to

## 2021-11-18 RX ORDER — PAROXETINE HYDROCHLORIDE 25 MG/1
25 TABLET, FILM COATED, EXTENDED RELEASE ORAL EVERY MORNING
Qty: 90 TABLET | Refills: 0 | Status: SHIPPED | OUTPATIENT
Start: 2021-11-18

## 2021-11-18 NOTE — TELEPHONE ENCOUNTER
Message noted: Chart reviewed and may refill medication as requested times one. Prescription sent to listed pharmacy. Pharmacy to notify patient to make appointment for further refills  Pt notified through [facility] also.

## 2021-11-29 ENCOUNTER — TELEPHONE (OUTPATIENT)
Dept: ALLERGY | Facility: CLINIC | Age: 41
End: 2021-11-29

## 2021-11-29 NOTE — TELEPHONE ENCOUNTER
Fax received from [facility] [address]. Patient last seen in Allergy 6/16/2020 for . . .    Seasonal allergic rhinitis due to pollen  (primary encounter diagnosis)  Allergic conjunctivitis, unspecified laterality     Refill request received for .  . Ela Sofia

## 2021-11-29 NOTE — TELEPHONE ENCOUNTER
90-day request denied. Will address at his follow-up appointment tomorrow.   Patient last seen in June 2020

## 2021-11-30 ENCOUNTER — TELEMEDICINE (OUTPATIENT)
Dept: ALLERGY | Facility: CLINIC | Age: 41
End: 2021-11-30
Payer: COMMERCIAL

## 2021-11-30 DIAGNOSIS — J30.1 SEASONAL ALLERGIC RHINITIS DUE TO POLLEN: Primary | ICD-10-CM

## 2021-11-30 DIAGNOSIS — Z28.21 COVID-19 VACCINE SERIES DECLINED: ICD-10-CM

## 2021-11-30 DIAGNOSIS — H10.10 ALLERGIC CONJUNCTIVITIS, UNSPECIFIED LATERALITY: ICD-10-CM

## 2021-11-30 PROCEDURE — 99213 OFFICE O/P EST LOW 20 MIN: CPT | Performed by: ALLERGY & IMMUNOLOGY

## 2021-11-30 NOTE — PATIENT INSTRUCTIONS
#1 allergic rhinitis  Continue with Xyzal on a seasonal basis from the start of spring through mid to end of July. May add Flonase 2 sprays per nostril once a day if having prominent nasal congestion postnasal drip.   Reviewed avoidance measures and potent

## 2021-11-30 NOTE — PROGRESS NOTES
Brando Perez is a 42year old male. HPI:   No chief complaint on file. Patient is a 50-year-old male who presents for follow-up via video visit.     This visit is conducted using Telemedicine with live, interactive video and audio during this Coron Problem Relation Age of Onset   • Skin cancer Paternal Grandfather       Social History: Social History    Tobacco Use      Smoking status: Former Smoker        Quit date: 2010        Years since quittin.4      Smokeless tobacco: Never Used    V prominent nasal congestion postnasal drip. Reviewed avoidance measures and potential treatment option of immunotherapy. Prior skin testing the past was positive to trees grass and weeds    #2 allergic conjunctivitis  Seasonal by history.   Xyzal once a da

## 2021-12-02 RX ORDER — LEVOCETIRIZINE DIHYDROCHLORIDE 5 MG/1
5 TABLET, FILM COATED ORAL NIGHTLY
Qty: 90 TABLET | Refills: 1 | Status: SHIPPED | OUTPATIENT
Start: 2021-12-02

## 2021-12-02 NOTE — TELEPHONE ENCOUNTER
Fax received from [facility] [address] asking for 90 day supply of Xyzal.     Patient last seen in Allergy 11/30/2021 for .  . .    Seasonal allergic rhinitis due to pollen  (primary encounter diagnosis)  Allergic conjunctivitis, unspecified laterality  Covid-19

## 2022-03-14 ENCOUNTER — TELEMEDICINE (OUTPATIENT)
Dept: FAMILY MEDICINE CLINIC | Facility: CLINIC | Age: 42
End: 2022-03-14

## 2022-03-14 DIAGNOSIS — F41.9 ANXIETY: ICD-10-CM

## 2022-03-14 PROCEDURE — 99213 OFFICE O/P EST LOW 20 MIN: CPT | Performed by: FAMILY MEDICINE

## 2022-03-14 RX ORDER — PAROXETINE HYDROCHLORIDE HEMIHYDRATE 25 MG/1
25 TABLET, FILM COATED, EXTENDED RELEASE ORAL EVERY MORNING
Qty: 30 TABLET | Refills: 0 | Status: CANCELLED | OUTPATIENT
Start: 2022-03-14

## 2022-03-14 RX ORDER — PAROXETINE HYDROCHLORIDE 25 MG/1
25 TABLET, FILM COATED, EXTENDED RELEASE ORAL EVERY MORNING
Qty: 30 TABLET | Refills: 0 | Status: SHIPPED
Start: 2022-03-14

## 2022-03-14 RX ORDER — PAROXETINE HYDROCHLORIDE 25 MG/1
25 TABLET, FILM COATED, EXTENDED RELEASE ORAL EVERY MORNING
Qty: 30 TABLET | Refills: 0 | Status: SHIPPED | OUTPATIENT
Start: 2022-03-14 | End: 2022-03-14

## 2022-03-14 RX ORDER — PAROXETINE HYDROCHLORIDE 25 MG/1
25 TABLET, FILM COATED, EXTENDED RELEASE ORAL EVERY MORNING
Qty: 90 TABLET | Refills: 3 | Status: SHIPPED | OUTPATIENT
Start: 2022-03-14 | End: 2022-03-14

## 2022-08-30 NOTE — TELEPHONE ENCOUNTER
Message noted: Chart reviewed and may refill medication as requested times one. Prescription sent to listed pharmacy.  Pharmacy to notify patient to make appointment for further refills
Low risk: PT sh no psych hx, now presents with delirium, no suicidal behavior or thinking.

## 2022-09-14 RX ORDER — LEVOCETIRIZINE DIHYDROCHLORIDE 5 MG/1
5 TABLET, FILM COATED ORAL NIGHTLY
Qty: 90 TABLET | Refills: 0 | Status: SHIPPED | OUTPATIENT
Start: 2022-09-14

## 2022-09-14 NOTE — TELEPHONE ENCOUNTER
[employer] message sent to patient will refill approval and the need to schedule a 15 minute office visit for November 2022.

## 2022-12-19 RX ORDER — LEVOCETIRIZINE DIHYDROCHLORIDE 5 MG/1
TABLET, FILM COATED ORAL
Qty: 90 TABLET | Refills: 3 | OUTPATIENT
Start: 2022-12-19

## 2023-03-27 ENCOUNTER — TELEPHONE (OUTPATIENT)
Dept: ALLERGY | Facility: CLINIC | Age: 43
End: 2023-03-27

## 2023-03-27 NOTE — TELEPHONE ENCOUNTER
Pt called would like to make an appt and prefers virtual for med refill. Next appt was on 6/3 and pt declined. Pt requesting virtual sooner.  Please advise

## 2023-03-27 NOTE — TELEPHONE ENCOUNTER
Spoke with patient. Verified name and . Offered patient a video visit for 23 at 11:45 am. Patient accepted appointment. no further questions at this time.

## 2023-04-04 ENCOUNTER — TELEMEDICINE (OUTPATIENT)
Dept: ALLERGY | Facility: CLINIC | Age: 43
End: 2023-04-04

## 2023-04-04 DIAGNOSIS — J30.1 SEASONAL ALLERGIC RHINITIS DUE TO POLLEN: Primary | ICD-10-CM

## 2023-04-04 DIAGNOSIS — H10.10 ALLERGIC CONJUNCTIVITIS, UNSPECIFIED LATERALITY: ICD-10-CM

## 2023-04-04 DIAGNOSIS — Z28.21 COVID-19 VACCINE SERIES DECLINED: ICD-10-CM

## 2023-04-04 DIAGNOSIS — Z28.310 COVID-19 VACCINE SERIES DECLINED: ICD-10-CM

## 2023-04-04 PROCEDURE — 99213 OFFICE O/P EST LOW 20 MIN: CPT | Performed by: ALLERGY & IMMUNOLOGY

## 2023-04-04 RX ORDER — AZELASTINE 1 MG/ML
2 SPRAY, METERED NASAL DAILY
Qty: 3 EACH | Refills: 2 | Status: SHIPPED | OUTPATIENT
Start: 2023-04-04

## 2023-04-04 RX ORDER — FLUTICASONE PROPIONATE 50 MCG
2 SPRAY, SUSPENSION (ML) NASAL DAILY
Qty: 3 EACH | Refills: 2 | Status: SHIPPED | OUTPATIENT
Start: 2023-04-04

## 2023-04-04 RX ORDER — LEVOCETIRIZINE DIHYDROCHLORIDE 5 MG/1
5 TABLET, FILM COATED ORAL EVERY EVENING
Qty: 90 TABLET | Refills: 2 | Status: SHIPPED | OUTPATIENT
Start: 2023-04-04

## 2023-04-04 NOTE — PATIENT INSTRUCTIONS
#1 allergic rhinitis  Active symptoms over the past 2 weeks while traveling to [place]. For work. No current fevers or mucopurulence  Continue with Xyzal, levocetirizine 5 g once a day  Add Astelin nasal spray 2 sprays per nostril twice a day as an antihistamine nasal spray. Add Flonase 2 sprays per nostril once a day if not improving with above recommendations  We will consider singular refractory. Reviewed avoidance measures and potential treatment option immunotherapy. Current work schedule does not allow/permit for considering immunotherapy at this time  Patient moving to [place] in the future. Reviewed allergist locally can be found at [facility]. org by searching by [place] ZIP Code    #2 COVID vaccines recommended. Patient defers at this time.     #3 consider flu vaccine in the fall      Follow-up in 1 year or sooner if needed

## 2023-04-05 ENCOUNTER — TELEPHONE (OUTPATIENT)
Dept: ALLERGY | Facility: CLINIC | Age: 43
End: 2023-04-05

## 2023-04-05 NOTE — TELEPHONE ENCOUNTER
Patient is calling to get refill of medications, [facility] did not have refill on file      levocetirizine 5 MG Oral Tab  fluticasone propionate 50 MCG/ACT Nasal Suspension  azelastine 0.1 % Nasal Solution

## 2023-04-06 NOTE — TELEPHONE ENCOUNTER
RN called [facility] to see whether they received medication orders for Levocetirizine, Flonase and Azelastine. Spoke with rep named Reyna Pires and she reports that yes they did receive those orders and the patient actually coordinated delivery for them yesterday. Order is on its way. No further action needed at this time.

## 2023-04-06 NOTE — TELEPHONE ENCOUNTER
Pt last seen on 4/4/2023. Refills sent to [facility] on 4/4/2023 during office visit. Per pt pharmacy did not receive them on 4/4. RN to call [facility] to see whether they received orders or not.

## 2023-10-06 ENCOUNTER — TELEPHONE (OUTPATIENT)
Dept: FAMILY MEDICINE CLINIC | Facility: CLINIC | Age: 43
End: 2023-10-06

## 2023-10-06 NOTE — TELEPHONE ENCOUNTER
Triage support, to clarify, the prior auth needed to be for  Paroxetine Hcl ER 25mg (generic for Paxil CR) not plain Paroxetine. He has been therapeutic on ER since 2004. Please confirm that prior auth is approved for ER--thank you.

## 2023-10-06 NOTE — TELEPHONE ENCOUNTER
Spoke with patient, STEVE verified. Informed him of the message from [address]. He states his medication has been covered in the past.  He will call insurance and get back to our office with an update. Office visit 10/03/2023  Assessment & Plan:   Anxiety:  - Stable: paxil ER reviewed and renewed; To continue present treatment; To call if problems; Routine follow up in 6-12 months. No orders of the defined types were placed in this encounter. Meds This Visit:  Requested Prescriptions             Signed Prescriptions Disp Refills    PARoxetine HCl ER 25 MG Oral Tablet 24 Hr 90 tablet 3       Sig: Take 1 tablet (25 mg total) by mouth every morning.

## 2023-10-06 NOTE — TELEPHONE ENCOUNTER
Patient calling back, confirmed name and . He confirms that his medication is Paroxetine Hcl ER 25mg (generic for Paxil CR). He states that he has been on this medication since . He states that he has been using [facility] for over one year for this medication. He has one week of medication left. Called [facility] and provided reference number below, confirmed patient's name and . They explain that formulary has changed but a prior auth can be attempted by calling [address] [phone] or by using the site Cover My Meds.

## 2023-10-06 NOTE — TELEPHONE ENCOUNTER
Dyana/[facility] [phone] states that they received a script for paroxetine HCI ER  and the insurance does not cover the medication. Dyana, states that the insurance does cover Paroxetine HCL tablets  10 20 30 or 40 milligrams. Does the doctor want to change the medication? Please, use reference # [id number]. Dyana wanted this marked as high priority. Current Outpatient Medications   Medication Sig Dispense Refill    Paroxetine HCl ER 25 MG Oral Tablet 24 Hr Take 1 tablet (25 mg total) by mouth every morning.  90 tablet 3

## 2023-10-06 NOTE — TELEPHONE ENCOUNTER
Prior authorization initiated ,await 1-5 days for decision      Per prior auth questionnaire the 10mg, 20mg, 30mg, and 40mg are covered

## 2023-10-06 NOTE — TELEPHONE ENCOUNTER
Faxed to [phone]  Approved    Prior authorization approved Case ID: [id number]      Payer: Thor Tamayo 19    [phone]   JESSEZISHERRILL:[id number];IBQAWK:[id number]; Review Type:Prior Auth; Coverage Start Date:09/06/2023; Coverage End Date:10/05/2024;    Approval Details    Authorized from September 6, 2023 to October 5, 2024      Electronic appeal: Not supported   View History

## 2023-10-10 ENCOUNTER — PATIENT OUTREACH (OUTPATIENT)
Dept: CASE MANAGEMENT | Age: 43
End: 2023-10-10

## 2023-10-10 NOTE — PROCEDURES
The office order for PCP removal request is Approved and finalized on October 10, 2023.     Thanks,  [facility] [employer]

## 2024-01-02 RX ORDER — LEVOCETIRIZINE DIHYDROCHLORIDE 5 MG/1
5 TABLET, FILM COATED ORAL EVERY EVENING
Qty: 90 TABLET | Refills: 3 | Status: SHIPPED | OUTPATIENT
Start: 2024-01-02

## 2024-01-02 NOTE — TELEPHONE ENCOUNTER
Requested Prescriptions   Pending Prescriptions Disp Refills    LEVOCETIRIZINE 5 MG Oral Tab [Pharmacy Med Name: LEVOCETIRIZINE DIHYDROCHLORIDE TABS 5MG] 90 tablet 3     Sig: TAKE 1 TABLET EVERY EVENING       Antihistamines Passed - 12/31/2023 11:24 PM        Passed - Appt in past 12 mos or next 1 mos     Recent Outpatient Visits              3 months ago [facility]    [facility]    9 months ago Seasonal allergic rhinitis due to pollen    [facility]    [facility]    1 year ago [facility]    [facility]    1 year ago Sinus pressure    [facility] - [address], Ariane Cummings MD    Office Visit    1 year ago Fistula, anal    Surgery - [place], Sam Dodd MD    Office Visit                         Refilled per med refill protocol.

## 2024-09-11 RX ORDER — PAROXETINE HYDROCHLORIDE HEMIHYDRATE 25 MG/1
25 TABLET, FILM COATED, EXTENDED RELEASE ORAL EVERY MORNING
Qty: 90 TABLET | Refills: 3 | Status: SHIPPED | OUTPATIENT
Start: 2024-09-11

## 2024-09-11 NOTE — TELEPHONE ENCOUNTER
Message noted: Chart reviewed and may refill medication as requested times one. Prescription sent to listed pharmacy. Pharmacy to notify patient to make appointment for further refills  Pt notified through [employer] also.

## 2024-09-11 NOTE — TELEPHONE ENCOUNTER
Please review. Protocol Failed; No Protocol    Requested Prescriptions   Pending Prescriptions Disp Refills    PAROXETINE HCL ER 25 MG Oral Tablet 24 Hr [Pharmacy Med Name: PAROXETINE HCL ER TABS 25MG] 90 tablet 3     Sig: TAKE 1 TABLET EVERY MORNING       Psychiatric Non-Scheduled (Anti-Anxiety) Failed - 9/8/2024 11:42 PM        Failed - In person appointment or virtual visit in the past 6 mos or appointment in next 3 mos     Recent Outpatient Visits              11 months ago [facility]    Office Visit    1 year ago Seasonal allergic rhinitis due to pollen    [facility]    [facility]    [facility]    2 years ago Sinus pressure    [facility] [address], Ishrat Ali, MD    Office Visit    2 years ago Fistula, anal    Surgery - [address], Bart, MD    Office Visit                      Failed - Depression Screening completed within the past 12 months                 Recent Outpatient [facility]its              [facility]    [facility] Visit    1 year ago Seasonal allergic rhinitis due to pollen    [facility]    [facility]    2 years ago Anxiety    [facility]    [facility]    2 years ago Sinus pressure    [facility] [address], Ishrat Ali, MD    Office Visit    2 years ago Fistula, anal    Surgery - [place], Sam Dodd MD    Office Visit

## 2024-09-12 ENCOUNTER — APPOINTMENT (OUTPATIENT)
Dept: URBAN - METROPOLITAN AREA SURGERY 13 | Age: 44
Setting detail: DERMATOLOGY
End: 2024-09-12

## 2024-09-12 DIAGNOSIS — D22 MELANOCYTIC NEVI: ICD-10-CM

## 2024-09-12 DIAGNOSIS — L81.4 OTHER MELANIN HYPERPIGMENTATION: ICD-10-CM

## 2024-09-12 DIAGNOSIS — B07.8 OTHER VIRAL WARTS: ICD-10-CM

## 2024-09-12 DIAGNOSIS — D18.0 HEMANGIOMA: ICD-10-CM

## 2024-09-12 DIAGNOSIS — L82.1 OTHER SEBORRHEIC KERATOSIS: ICD-10-CM

## 2024-09-12 PROBLEM — D22.5 MELANOCYTIC NEVI OF TRUNK: Status: ACTIVE | Noted: 2024-09-12

## 2024-09-12 PROBLEM — D18.01 HEMANGIOMA OF SKIN AND SUBCUTANEOUS TISSUE: Status: ACTIVE | Noted: 2024-09-12

## 2024-09-12 PROCEDURE — OTHER REASSURANCE: OTHER

## 2024-09-12 PROCEDURE — OTHER SUNSCREEN RECOMMENDATIONS: OTHER

## 2024-09-12 PROCEDURE — OTHER LIQUID NITROGEN: OTHER

## 2024-09-12 PROCEDURE — 99203 OFFICE O/P NEW LOW 30 MIN: CPT | Mod: 25

## 2024-09-12 PROCEDURE — 17110 DESTRUCT B9 LESION 1-14: CPT

## 2024-09-12 PROCEDURE — OTHER MIPS QUALITY: OTHER

## 2024-09-12 PROCEDURE — OTHER COUNSELING: OTHER

## 2024-09-12 ASSESSMENT — LOCATION DETAILED DESCRIPTION DERM
LOCATION DETAILED: RIGHT DISTAL RADIAL DORSAL FOREARM
LOCATION DETAILED: LEFT MID-UPPER BACK
LOCATION DETAILED: RIGHT SUPERIOR UPPER BACK
LOCATION DETAILED: RIGHT MID-UPPER BACK
LOCATION DETAILED: RIGHT DISTAL DORSAL FOREARM
LOCATION DETAILED: RIGHT DISTAL DORSAL FOREARM

## 2024-09-12 ASSESSMENT — LOCATION SIMPLE DESCRIPTION DERM
LOCATION SIMPLE: LEFT UPPER BACK
LOCATION SIMPLE: RIGHT FOREARM
LOCATION SIMPLE: RIGHT UPPER BACK
LOCATION SIMPLE: RIGHT FOREARM

## 2024-09-12 ASSESSMENT — LOCATION ZONE DERM
LOCATION ZONE: TRUNK
LOCATION ZONE: ARM
LOCATION ZONE: ARM

## 2024-12-24 NOTE — TELEPHONE ENCOUNTER
Refill requested for   Requested Prescriptions   Pending Prescriptions Disp Refills    LEVOCETIRIZINE 5 MG Oral Tab [Pharmacy Med Name: LEVOCETIRIZINE DIHYDROCHLORIDE TABS 5MG] 90 tablet 3     Sig: TAKE 1 TABLET EVERY EVENING       Antihistamines Failed - 12/24/2024  7:45 AM        Failed - Appt in past 12 mos or next 1 mos     Recent Outpatient [facility]its              [facility]    [facility]    1 year ago Seasonal allergic rhinitis due to pollen    [facility], Julio Cesar Gayle MD    Telemedicine    2 years ago [facility]    2 years ago Sinus pressure    [facility], Ariane Cummings MD    Office Visit    2 years ago Fistula, anal    Surgery - [place], Sam Dodd MD    Office Visit                            Last office visit: 4/4/23    Previously advised to follow up in No follow-up timeline advised in last office visit.     F/U currently scheduled? Not at this time     Date of last refill: 1/2/24    ACTION: RN left voicemail for patient  Patient overdue for follow-up appointment   May send out refill once appointment is scheduled  Left office hours and call back number

## 2024-12-30 ENCOUNTER — TELEPHONE (OUTPATIENT)
Dept: FAMILY MEDICINE CLINIC | Facility: CLINIC | Age: 44
End: 2024-12-30

## 2024-12-30 RX ORDER — LEVOCETIRIZINE DIHYDROCHLORIDE 5 MG/1
5 TABLET, FILM COATED ORAL EVERY EVENING
Qty: 90 TABLET | Refills: 3 | OUTPATIENT
Start: 2024-12-30

## 2024-12-30 NOTE — TELEPHONE ENCOUNTER
Appointment still has not been scheduled.   Wow[employer] message sent and voicemail left by Ela FRAIRE.    Refill denied at this time until pt has scheduled a follow up.

## 2024-12-30 NOTE — TELEPHONE ENCOUNTER
Patient called requesting a refill for the following medication:     Medication Quantity Refills Start End   PARoxetine HCl ER 25 MG Oral Tablet 24 Hr 90 tablet 3 9/11/2024 --   Sig:   Take 1 tablet (25 mg total) by mouth every morning.     Route:   Oral     Note to Pharmacy:   Needs appointment for further refills     Order #:   [id number]         Pharmacy:   [facility] - [address] [phone], [phone]       Patient has 6 more days worth of medication.

## (undated) NOTE — MR AVS SNAPSHOT
[facility] SPECIALTY [facility] - [place] [address] [address] [address] [phone]               Thank you for choosing us for your health care visit with Socorro Hoskins MD.  We are glad to serve you and happy to provide you with this summary o [address] [phone], [phone]  [facility] [address]     Phone:  [phone]    - Levocetirizine Dihydrochloride 5 MG Tabs            MyChart     Visit MyChart  You can access your MyChart to more actively [facility] [place]

## (undated) NOTE — ED AVS SNAPSHOT
[facility] [address] [place] [address]    Phone:  [phone]    Fax:  Harriet   MRN: [id number]    Department:  THE [facility]   Date of Visit: - azithromycin 250 MG Tabs  - benzonatate 100 MG Caps            Discharge References/Attachments     BRONCHITIS, ANTIOBIOTIC TREATMENT (ADULT) (ENGLISH)      Disclosure     Insurance plans vary and the physician(s) referred by the Immediate Care may not b IF THERE IS ANY CHANGE OR WORSENING OF YOUR CONDITION, CALL YOUR PRIMARY CARE PHYSICIAN AT ONCE OR GO TO THE EMERGENCY DEPARTMENT.     If you have been prescribed any medication(s), please fill your prescription right away and begin taking the medication(s) Patient [address] to help you get signed up for insurance coverage. Patient [address] is a Federal Navigator program that can help with your Affordable Care Act coverage, as well as all types of Medicaid plans.   To get signed up and covere

## (undated) NOTE — ED AVS SNAPSHOT
[facility] Emergency Department    [place]  [address] [place] [address]    Phone:  [phone]    Fax:  [phone]   MRN: [id number]    Department:  [facility] Emergency Department   Date of Visit:  2/23 IF THERE IS ANY CHANGE OR WORSENING OF YOUR CONDITION, CALL YOUR PRIMARY CARE PHYSICIAN AT ONCE OR RETURN IMMEDIATELY TO THE EMERGENCY DEPARTMENT.     If you have been prescribed any medication(s), please fill your prescription right away and begin taking t

## (undated) NOTE — ED AVS SNAPSHOT
[facility] Emergency Department    [facility]  [address] [place] [address]    Phone:  [phone]    Fax:  [phone]   MRN: [id number]    Department:  [facility] Emergency Department   Date of Visit:  2/23 [address] [employer] [place] [phone]  Pediatric [id number] Emergency Department   [phone]       To Check ER Wait Times:  TEXT 'ERwait' to 64269      Click [email or web address]      Or call [phone]    If you have any will be contacted. Please make sure we have your correct phone number before you leave. After you leave, you should follow the attached instructions. I have read and understand the instructions given to me by my caregivers.         24-Hour Pharmacies XR CHEST PA + LAT CHEST (CPT=71020) (Final result) Result time:  02/23/17 08:03:25    Final result    Impression:    CONCLUSION:  No acute findings.            Dictated by: Christina Martinez MD on 2/23/2017 at 8:02       Approved by: Christina Martinez MD

## (undated) NOTE — MR AVS SNAPSHOT
[facility] [place] [address] [address] [address] [phone]               Thank you for choosing us for your health care visit with Fátima Lerner MD.  We are glad to serve you and happy to provide you with this summary of y Take 1 tablet (0.25 mg total) by mouth nightly as needed for Anxiety. Commonly known as:  XANAX           Fluticasone Propionate 50 MCG/ACT Susp   2 sprays by Each Nare route daily.    Commonly known as:  FLONASE           Levocetirizine Dihydrochloride 5

## (undated) NOTE — MR AVS SNAPSHOT
[facility] SPECIALTY [facility] - [place] [address] [address] [address] [phone]               Thank you for choosing us for your health care visit with Nestor Goodpasture, MD.  We are glad to serve you and happy to provide you with this summary of y These medications were sent to [address] [place] [address],[facility] [address]     Phone:  [phone]    - amoxicillin 875 MG Tabs            MyChart     Visit MyChart  You can

## (undated) NOTE — LETTER
10/6/2023              [place] [address]         To Whom It May Concern,    Paroxetine 25mg       Approved    Prior authorization approved Case ID: [id number]      Payer: Thor Tamayo 19    [phone]    [phone]   CWXWOS:[id number];SZWOTH:[id number]; Review Type:Prior Auth; Coverage Start Date:09/06/2023; Coverage End Date:10/05/2024;    Approval Details    Authorized from September 6, 2023 to October 5, 2024      Electronic appeal: Not supported   View History       Sincerely,    Ninoska Bains MD  [facility] [address] [facility]  [phone]